# Patient Record
Sex: FEMALE | Race: WHITE | Employment: OTHER | URBAN - METROPOLITAN AREA
[De-identification: names, ages, dates, MRNs, and addresses within clinical notes are randomized per-mention and may not be internally consistent; named-entity substitution may affect disease eponyms.]

---

## 2022-10-26 LAB — URINE CULTURE, ROUTINE: NORMAL

## 2023-04-18 ENCOUNTER — OFFICE VISIT (OUTPATIENT)
Dept: ENT CLINIC | Age: 73
End: 2023-04-18
Payer: MEDICARE

## 2023-04-18 VITALS
SYSTOLIC BLOOD PRESSURE: 94 MMHG | HEART RATE: 82 BPM | WEIGHT: 185 LBS | DIASTOLIC BLOOD PRESSURE: 61 MMHG | BODY MASS INDEX: 32.78 KG/M2 | OXYGEN SATURATION: 98 % | HEIGHT: 63 IN | TEMPERATURE: 98.2 F

## 2023-04-18 DIAGNOSIS — H61.22 IMPACTED CERUMEN OF LEFT EAR: Primary | ICD-10-CM

## 2023-04-18 DIAGNOSIS — H90.12 CONDUCTIVE HEARING LOSS OF LEFT EAR WITH UNRESTRICTED HEARING OF RIGHT EAR: ICD-10-CM

## 2023-04-18 PROCEDURE — 69210 REMOVE IMPACTED EAR WAX UNI: CPT | Performed by: OTOLARYNGOLOGY

## 2023-04-18 RX ORDER — PANTOPRAZOLE SODIUM 40 MG/1
40 TABLET, DELAYED RELEASE ORAL DAILY
COMMUNITY
Start: 2019-08-09

## 2023-04-18 RX ORDER — FLUCONAZOLE 150 MG/1
150 TABLET ORAL ONCE
COMMUNITY
Start: 2023-03-30

## 2023-04-18 RX ORDER — DULOXETIN HYDROCHLORIDE 60 MG/1
CAPSULE, DELAYED RELEASE ORAL DAILY
COMMUNITY
Start: 2023-03-29

## 2023-04-18 RX ORDER — FLUTICASONE PROPIONATE 50 MCG
1 SPRAY, SUSPENSION (ML) NASAL DAILY
COMMUNITY

## 2023-04-18 RX ORDER — GOLIMUMAB 50 MG/4ML
SOLUTION INTRAVENOUS
COMMUNITY
Start: 2019-11-18

## 2023-04-18 RX ORDER — AMLODIPINE BESYLATE AND BENAZEPRIL HYDROCHLORIDE 5; 20 MG/1; MG/1
CAPSULE ORAL DAILY
COMMUNITY

## 2023-04-18 RX ORDER — BLOOD-GLUCOSE METER
EACH MISCELLANEOUS
COMMUNITY
Start: 2023-01-14

## 2023-04-18 RX ORDER — FOLIC ACID 1 MG/1
1000 TABLET ORAL DAILY
COMMUNITY
Start: 2023-03-02

## 2023-04-18 RX ORDER — SEMAGLUTIDE 1.34 MG/ML
INJECTION, SOLUTION SUBCUTANEOUS
COMMUNITY
Start: 2023-04-17

## 2023-04-18 RX ORDER — CLOPIDOGREL BISULFATE 75 MG/1
75 TABLET ORAL DAILY
COMMUNITY
Start: 2021-05-20

## 2023-04-18 RX ORDER — HUMAN INSULIN 100 [IU]/ML
INJECTION, SUSPENSION SUBCUTANEOUS
COMMUNITY
Start: 2023-04-07

## 2023-04-18 RX ORDER — PROGESTERONE 200 MG/1
200 CAPSULE ORAL NIGHTLY
COMMUNITY
Start: 2020-03-03

## 2023-04-18 RX ORDER — EVOLOCUMAB 140 MG/ML
INJECTION, SOLUTION SUBCUTANEOUS
COMMUNITY
Start: 2023-03-01

## 2023-04-18 RX ORDER — DIPHENHYDRAMINE HCL 25 MG
25 CAPSULE ORAL EVERY 6 HOURS PRN
COMMUNITY

## 2023-04-18 RX ORDER — ACETAMINOPHEN 500 MG
500-1000 TABLET ORAL EVERY 6 HOURS
COMMUNITY
Start: 2022-07-08

## 2023-04-18 RX ORDER — METHOTREXATE 2.5 MG/1
TABLET ORAL
COMMUNITY
Start: 2023-01-09

## 2023-04-18 RX ORDER — LORATADINE 10 MG/1
TABLET ORAL DAILY
COMMUNITY

## 2023-04-18 RX ORDER — POLYETHYLENE GLYCOL 400 AND PROPYLENE GLYCOL 4; 3 MG/ML; MG/ML
1 SOLUTION/ DROPS OPHTHALMIC PRN
COMMUNITY

## 2023-04-18 NOTE — PATIENT INSTRUCTIONS
Schedule an audiogram (hearing test), if your hearing is not back to your usual ability, or if hearing loss or tinnitus (ringing or other noise) persists, despite removal of ear wax,. Schedule an appointment for ear recheck and possible cleaning in the future if your hearing is decreased, or you have a sensation of ear wax build up or are told you have ear wax build up by your primary physician or other health care provider. You may use an over the counter ear wax removal kit (such as Murine, Bausch and Lomb, NeilMed, or Debrox wax removal system) for ear wax removal, as needed. It may help to use Debrox (OTC) for 4 days prior to future visits for ear cleaning. This may soften your ear wax and facilitate removal of the wax. NO Q-TIPS OR OTHER INSTRUMENTS/OBJECTS IN THE EARS   You should never clean your ears with a Q-tip, cotton tipped applicator, Christine pin, paper clip, safety pin, pen cap, or any other instrument. This will tend to push wax in deeper and pack the ear canal with wax. There is a high risk and danger of this practice, especially rupture of ear drum, dislocation or other damage to ossicles, and permanent, irreversible, and irreparable hearing loss. It may cause inflammation and irritation of the ear canal and cause itching or pain. I recommend only use of one the several ear wax removal kits available \"over the counter\" if you feel a need to try to remove ear wax. For example, Murine, Bausch and Lomb, NeilMed, or Debrox ear wax removal kits may be used for ear wax removal, as needed. No other methods should be self used for cleaning your ears.

## 2023-04-18 NOTE — PROGRESS NOTES
Chief Complaint   Patient presents with    Cerumen Impaction    Hearing Loss       HISTORY:    Matilde Yao stated that the left ear is plugged with wax and hearing is decreased in left ear. EXAMINATION:    The left ear was occluded by cerumen impaction, obscuring visualization of the TM. The right TM and EAC were normal.       PROCEDURE - REMOVAL OF LEFT CERUMEN IMPACTION:    The cerumen impaction occluding the left EAC was removed, under otomicroscopy visualization, with instrumentation, using a Billeau wire loop and alligator forceps. After successful cerumen removal, the left EAC appeared to be normal and clear without mass, exudate, or edema. The left tympanic membrane appeared to be normal, including normal pneumatic mobility. There was no evidence of acute disease. Matilde Yao reported improved hearing, back to usual level, after cerumen removal.  \"I can hear better already. it's better. it's good      HEARING ASSESSMENT (after left ear cleaning):  Finger rub:  Able to hear finger rub bilaterally. Tuning fork tests, 512 Hertz tuning fork:  Storey was nonlateralizing and was heard in the midline. Rinne test showed air conduction greater than bone conduction bilaterally. IMPRESSION / Rucker Ripa / Thermon Remedies / PROCEDURES:       Matilde Yao was seen today for cerumen impaction and hearing loss. Diagnoses and all orders for this visit:    Impacted cerumen of left ear    Conductive hearing loss of left ear with unrestricted hearing of right ear         RECOMMENDATIONS / PLAN:   See Patient Instructions. Return for recurrence of symptoms of excessive ear wax, or any other ear, nose, throat, or sinus problems.

## 2024-09-04 ENCOUNTER — OFFICE VISIT (OUTPATIENT)
Dept: UROGYNECOLOGY | Age: 74
End: 2024-09-04
Payer: MEDICARE

## 2024-09-04 VITALS
DIASTOLIC BLOOD PRESSURE: 70 MMHG | OXYGEN SATURATION: 100 % | RESPIRATION RATE: 18 BRPM | HEART RATE: 85 BPM | TEMPERATURE: 98 F | SYSTOLIC BLOOD PRESSURE: 101 MMHG

## 2024-09-04 DIAGNOSIS — R39.15 URINARY URGENCY: Primary | ICD-10-CM

## 2024-09-04 DIAGNOSIS — N90.7 LABIAL CYST: ICD-10-CM

## 2024-09-04 DIAGNOSIS — R35.0 URINARY FREQUENCY: ICD-10-CM

## 2024-09-04 LAB
BILIRUBIN, POC: NORMAL
BLOOD URINE, POC: NORMAL
CLARITY, POC: CLEAR
COLOR, POC: YELLOW
EMPTY COUGH STRESS TEST: NORMAL
FIRST SENSATION: 125 CC
FULL COUGH STRESS TEST: NORMAL
GLUCOSE URINE, POC: NORMAL MG/DL
KETONES, POC: NORMAL MG/DL
LEUKOCYTE EST, POC: NORMAL
MAX SENSATION: 225 CC
NITRATE, URINE POC: NORMAL
NITRITE, POC: NORMAL
PH, POC: 6
POST VOID RESIDUAL (PVR): 25 ML
PROTEIN, POC: NORMAL MG/DL
RBC URINE, POC: NORMAL
SECOND SENSATION: 185 CC
SPASM: NORMAL
SPECIFIC GRAVITY, POC: 1.01
UROBILINOGEN, POC: NORMAL MG/DL
WBC URINE, POC: NORMAL

## 2024-09-04 PROCEDURE — 99204 OFFICE O/P NEW MOD 45 MIN: CPT | Performed by: OBSTETRICS & GYNECOLOGY

## 2024-09-04 PROCEDURE — 1036F TOBACCO NON-USER: CPT | Performed by: OBSTETRICS & GYNECOLOGY

## 2024-09-04 PROCEDURE — 3017F COLORECTAL CA SCREEN DOC REV: CPT | Performed by: OBSTETRICS & GYNECOLOGY

## 2024-09-04 PROCEDURE — 1090F PRES/ABSN URINE INCON ASSESS: CPT | Performed by: OBSTETRICS & GYNECOLOGY

## 2024-09-04 PROCEDURE — G8400 PT W/DXA NO RESULTS DOC: HCPCS | Performed by: OBSTETRICS & GYNECOLOGY

## 2024-09-04 PROCEDURE — G8421 BMI NOT CALCULATED: HCPCS | Performed by: OBSTETRICS & GYNECOLOGY

## 2024-09-04 PROCEDURE — 51725 SIMPLE CYSTOMETROGRAM: CPT | Performed by: OBSTETRICS & GYNECOLOGY

## 2024-09-04 PROCEDURE — 81002 URINALYSIS NONAUTO W/O SCOPE: CPT | Performed by: OBSTETRICS & GYNECOLOGY

## 2024-09-04 PROCEDURE — G8427 DOCREV CUR MEDS BY ELIG CLIN: HCPCS | Performed by: OBSTETRICS & GYNECOLOGY

## 2024-09-04 PROCEDURE — 1123F ACP DISCUSS/DSCN MKR DOCD: CPT | Performed by: OBSTETRICS & GYNECOLOGY

## 2024-09-04 ASSESSMENT — ENCOUNTER SYMPTOMS
RECTAL PAIN: 1
SORE THROAT: 1
EYE ITCHING: 1
BLOOD IN STOOL: 1
NAUSEA: 1
DIARRHEA: 1
ABDOMINAL PAIN: 1
TROUBLE SWALLOWING: 1
CONSTIPATION: 1
ABDOMINAL DISTENTION: 1
BACK PAIN: 1
EYE PAIN: 1
EYE REDNESS: 1

## 2024-09-04 NOTE — PROGRESS NOTES
R thumb fusion    JOINT REPLACEMENT      LITHOTRIPSY  2011    URETHRA SURGERY  1974    urethral diverticulum     Allergies:   Allergies   Allergen Reactions    Adhesive Tape Rash     itching    Other Other (See Comments)     Pseudocolinesterace Deficiency     Nickel Rash    Aspirin Diarrhea and Dizziness or Vertigo    Nsaids Diarrhea and Dizziness or Vertigo    Remicade [Infliximab]     Skin Protectants, Misc. Other (See Comments)     Vicryl sutures and Surgical glue > area does not heal    Sulfa Antibiotics Hives     Current Medications:  Current Outpatient Medications   Medication Sig Dispense Refill    NONFORMULARY T-pellets natural hormones      Insulin Degludec (TRESIBA SC) Inject 16 Units into the skin      Romosozumab-aqqg (EVENITY SC) Inject into the skin      DULoxetine (CYMBALTA) 60 MG extended release capsule Take by mouth daily      clopidogrel (PLAVIX) 75 MG tablet Take 1 tablet by mouth daily      acetaminophen (TYLENOL) 500 MG tablet Take 1-2 tablets by mouth every 6 hours      amLODIPine-benazepril (LOTREL) 5-20 MG per capsule daily      Blood Glucose Monitoring Suppl (ACCU-CHEK GUIDE ME) w/Device KIT USE FOR BLOOD SUGAR MONITORING. DX E11.9      REPATHA SURECLICK 140 MG/ML SOAJ USE 1 ML (140 MG) EVERY 14 (FOURTEEN) DAYS.      folic acid (FOLVITE) 1 MG tablet Take 1 tablet by mouth daily      NOVOLIN 70/30 FLEXPEN (70-30) 100 UNIT/ML injection pen INJECT SUBCUTANEOUSLY AS DIRECTED TWICE DAILY WITH MEALS UP TO A MAX TOTAL DAILY DOSE OF 50 UNITS.      loratadine (CLARITIN) 10 MG tablet daily      pantoprazole (PROTONIX) 40 MG tablet Take 1 tablet by mouth daily      progesterone (PROMETRIUM) 200 MG CAPS capsule Take 1 capsule by mouth nightly      OZEMPIC, 0.25 OR 0.5 MG/DOSE, 2 MG/1.5ML SOPN       fluticasone (FLONASE) 50 MCG/ACT nasal spray 1 spray by Each Nostril route daily      diphenhydrAMINE (BENADRYL ALLERGY) 25 MG capsule Take 1 capsule by mouth every 6 hours as needed for Itching